# Patient Record
Sex: MALE | Race: WHITE | NOT HISPANIC OR LATINO | Employment: UNEMPLOYED | URBAN - METROPOLITAN AREA
[De-identification: names, ages, dates, MRNs, and addresses within clinical notes are randomized per-mention and may not be internally consistent; named-entity substitution may affect disease eponyms.]

---

## 2024-07-12 ENCOUNTER — OFFICE VISIT (OUTPATIENT)
Dept: FAMILY MEDICINE | Facility: OTHER | Age: 29
End: 2024-07-12
Payer: COMMERCIAL

## 2024-07-12 VITALS
HEIGHT: 69 IN | OXYGEN SATURATION: 98 % | BODY MASS INDEX: 32.58 KG/M2 | DIASTOLIC BLOOD PRESSURE: 74 MMHG | HEART RATE: 77 BPM | SYSTOLIC BLOOD PRESSURE: 122 MMHG | WEIGHT: 220 LBS | TEMPERATURE: 99 F | RESPIRATION RATE: 18 BRPM

## 2024-07-12 DIAGNOSIS — R40.0 DAYTIME SLEEPINESS: ICD-10-CM

## 2024-07-12 DIAGNOSIS — E55.9 VITAMIN D DEFICIENCY: ICD-10-CM

## 2024-07-12 DIAGNOSIS — F17.200 NICOTINE DEPENDENCE WITH CURRENT USE: ICD-10-CM

## 2024-07-12 DIAGNOSIS — R53.83 MALAISE AND FATIGUE: ICD-10-CM

## 2024-07-12 DIAGNOSIS — K21.9 GASTROESOPHAGEAL REFLUX DISEASE WITHOUT ESOPHAGITIS: ICD-10-CM

## 2024-07-12 DIAGNOSIS — N40.0 BENIGN PROSTATIC HYPERPLASIA WITHOUT LOWER URINARY TRACT SYMPTOMS: ICD-10-CM

## 2024-07-12 DIAGNOSIS — Z00.00 ROUTINE HISTORY AND PHYSICAL EXAMINATION OF ADULT: Primary | ICD-10-CM

## 2024-07-12 DIAGNOSIS — E66.811 OBESITY (BMI 30.0-34.9): ICD-10-CM

## 2024-07-12 DIAGNOSIS — G47.00 PERSISTENT INSOMNIA: ICD-10-CM

## 2024-07-12 DIAGNOSIS — F20.9 SCHIZOPHRENIA, UNSPECIFIED TYPE (H): ICD-10-CM

## 2024-07-12 DIAGNOSIS — E78.5 HYPERLIPIDEMIA LDL GOAL <130: ICD-10-CM

## 2024-07-12 DIAGNOSIS — R53.81 MALAISE AND FATIGUE: ICD-10-CM

## 2024-07-12 LAB
ALBUMIN SERPL BCG-MCNC: 4.5 G/DL (ref 3.5–5.2)
ALP SERPL-CCNC: 81 U/L (ref 40–150)
ALT SERPL W P-5'-P-CCNC: 28 U/L (ref 0–70)
ANION GAP SERPL CALCULATED.3IONS-SCNC: 10 MMOL/L (ref 7–15)
AST SERPL W P-5'-P-CCNC: 21 U/L (ref 0–45)
BILIRUB SERPL-MCNC: 0.6 MG/DL
BUN SERPL-MCNC: 13.1 MG/DL (ref 6–20)
CALCIUM SERPL-MCNC: 9.2 MG/DL (ref 8.6–10)
CHLORIDE SERPL-SCNC: 101 MMOL/L (ref 98–107)
CHOLEST SERPL-MCNC: 192 MG/DL
CREAT SERPL-MCNC: 0.86 MG/DL (ref 0.67–1.17)
DEPRECATED HCO3 PLAS-SCNC: 26 MMOL/L (ref 22–29)
EGFRCR SERPLBLD CKD-EPI 2021: >90 ML/MIN/1.73M2
ERYTHROCYTE [DISTWIDTH] IN BLOOD BY AUTOMATED COUNT: 12.7 % (ref 10–15)
FASTING STATUS PATIENT QL REPORTED: YES
FASTING STATUS PATIENT QL REPORTED: YES
GLUCOSE SERPL-MCNC: 93 MG/DL (ref 70–99)
HCT VFR BLD AUTO: 45.5 % (ref 40–53)
HDLC SERPL-MCNC: 41 MG/DL
HGB BLD-MCNC: 15.4 G/DL (ref 13.3–17.7)
LDLC SERPL CALC-MCNC: 117 MG/DL
MCH RBC QN AUTO: 30.1 PG (ref 26.5–33)
MCHC RBC AUTO-ENTMCNC: 33.8 G/DL (ref 31.5–36.5)
MCV RBC AUTO: 89 FL (ref 78–100)
NONHDLC SERPL-MCNC: 151 MG/DL
PLATELET # BLD AUTO: 298 10E3/UL (ref 150–450)
POTASSIUM SERPL-SCNC: 4.4 MMOL/L (ref 3.4–5.3)
PROT SERPL-MCNC: 7.5 G/DL (ref 6.4–8.3)
PSA SERPL DL<=0.01 NG/ML-MCNC: 0.61 NG/ML
RBC # BLD AUTO: 5.11 10E6/UL (ref 4.4–5.9)
SODIUM SERPL-SCNC: 137 MMOL/L (ref 135–145)
TRIGL SERPL-MCNC: 172 MG/DL
WBC # BLD AUTO: 8.3 10E3/UL (ref 4–11)

## 2024-07-12 PROCEDURE — 99385 PREV VISIT NEW AGE 18-39: CPT | Performed by: PHYSICIAN ASSISTANT

## 2024-07-12 PROCEDURE — 86481 TB AG RESPONSE T-CELL SUSP: CPT | Performed by: PHYSICIAN ASSISTANT

## 2024-07-12 PROCEDURE — 80061 LIPID PANEL: CPT | Performed by: PHYSICIAN ASSISTANT

## 2024-07-12 PROCEDURE — G0103 PSA SCREENING: HCPCS | Performed by: PHYSICIAN ASSISTANT

## 2024-07-12 PROCEDURE — 80053 COMPREHEN METABOLIC PANEL: CPT | Performed by: PHYSICIAN ASSISTANT

## 2024-07-12 PROCEDURE — 99213 OFFICE O/P EST LOW 20 MIN: CPT | Mod: 25 | Performed by: PHYSICIAN ASSISTANT

## 2024-07-12 PROCEDURE — 85027 COMPLETE CBC AUTOMATED: CPT | Performed by: PHYSICIAN ASSISTANT

## 2024-07-12 PROCEDURE — 36415 COLL VENOUS BLD VENIPUNCTURE: CPT | Performed by: PHYSICIAN ASSISTANT

## 2024-07-12 RX ORDER — ACETAMINOPHEN 325 MG/1
650 TABLET, CHEWABLE ORAL EVERY 4 HOURS
COMMUNITY

## 2024-07-12 RX ORDER — LANOLIN ALCOHOL/MO/W.PET/CERES
1 CREAM (GRAM) TOPICAL AT BEDTIME
COMMUNITY
Start: 2024-05-22

## 2024-07-12 RX ORDER — HALOPERIDOL 10 MG/1
1 TABLET ORAL AT BEDTIME
COMMUNITY
Start: 2024-06-10

## 2024-07-12 RX ORDER — NICOTINE 21 MG/24HR
1 PATCH, TRANSDERMAL 24 HOURS TRANSDERMAL
COMMUNITY
Start: 2024-05-22

## 2024-07-12 RX ORDER — AMPICILLIN TRIHYDRATE 500 MG
1000 CAPSULE ORAL
COMMUNITY
Start: 2024-05-22

## 2024-07-12 RX ORDER — BUPROPION HYDROCHLORIDE 150 MG/1
150 TABLET ORAL
COMMUNITY
Start: 2024-04-24

## 2024-07-12 RX ORDER — LURASIDONE HYDROCHLORIDE 80 MG/1
80 TABLET, FILM COATED ORAL
COMMUNITY
Start: 2024-04-23

## 2024-07-12 RX ORDER — CALCIUM CARBONATE 500(1250)
1250 TABLET,CHEWABLE ORAL
COMMUNITY

## 2024-07-12 RX ORDER — CYCLOBENZAPRINE HCL 10 MG
10 TABLET ORAL
COMMUNITY
Start: 2024-06-27

## 2024-07-12 RX ORDER — TAMSULOSIN HYDROCHLORIDE 0.4 MG/1
1 CAPSULE ORAL AT BEDTIME
COMMUNITY
Start: 2024-04-24

## 2024-07-12 SDOH — HEALTH STABILITY: PHYSICAL HEALTH: ON AVERAGE, HOW MANY DAYS PER WEEK DO YOU ENGAGE IN MODERATE TO STRENUOUS EXERCISE (LIKE A BRISK WALK)?: 0 DAYS

## 2024-07-12 ASSESSMENT — PATIENT HEALTH QUESTIONNAIRE - PHQ9
SUM OF ALL RESPONSES TO PHQ QUESTIONS 1-9: 24
10. IF YOU CHECKED OFF ANY PROBLEMS, HOW DIFFICULT HAVE THESE PROBLEMS MADE IT FOR YOU TO DO YOUR WORK, TAKE CARE OF THINGS AT HOME, OR GET ALONG WITH OTHER PEOPLE: EXTREMELY DIFFICULT
SUM OF ALL RESPONSES TO PHQ QUESTIONS 1-9: 24

## 2024-07-12 ASSESSMENT — SOCIAL DETERMINANTS OF HEALTH (SDOH): HOW OFTEN DO YOU GET TOGETHER WITH FRIENDS OR RELATIVES?: NEVER

## 2024-07-12 ASSESSMENT — PAIN SCALES - GENERAL: PAINLEVEL: MODERATE PAIN (4)

## 2024-07-12 NOTE — LETTER
July 12, 2024      Daryn Chavarria  [address] [address]              Dear ,    We are writing to inform you of your test results.    Complete blood cell count, electrolytes, kidney and liver function as well as prostate-specific antigen are within normal limits at this point in time.     Cholesterol profile shows need for increased physical exercise to raise your HDL above 50 as an ultimate goal.  LDL cholesterol is somewhat higher than I like to see but when you are 28 years of age you have some time to take a look at lifestyle modifications like diet and exercise to address this more completely.  Do recommend that you pay careful attention to this and follow-up in 1 year.     Resulted Orders   CBC with platelets   Result Value Ref Range    WBC Count 8.3 4.0 - 11.0 10e3/uL    RBC Count 5.11 4.40 - 5.90 10e6/uL    Hemoglobin 15.4 13.3 - 17.7 g/dL    Hematocrit 45.5 40.0 - 53.0 %    MCV 89 78 - 100 fL    MCH 30.1 26.5 - 33.0 pg    MCHC 33.8 31.5 - 36.5 g/dL    RDW 12.7 10.0 - 15.0 %    Platelet Count 298 150 - 450 10e3/uL   Comprehensive metabolic panel (BMP + Alb, Alk Phos, ALT, AST, Total. Bili, TP)   Result Value Ref Range    Sodium 137 135 - 145 mmol/L    Potassium 4.4 3.4 - 5.3 mmol/L    Carbon Dioxide (CO2) 26 22 - 29 mmol/L    Anion Gap 10 7 - 15 mmol/L    Urea Nitrogen 13.1 6.0 - 20.0 mg/dL    Creatinine 0.86 0.67 - 1.17 mg/dL    GFR Estimate >90 >60 mL/min/1.73m2      Comment:      eGFR calculated using 2021 CKD-EPI equation.    Calcium 9.2 8.6 - 10.0 mg/dL    Chloride 101 98 - 107 mmol/L    Glucose 93 70 - 99 mg/dL    Alkaline Phosphatase 81 40 - 150 U/L    AST 21 0 - 45 U/L      Comment:      Reference intervals for this test were updated on 6/12/2023 to more accurately reflect our healthy population. There may be differences in the flagging of prior results with similar values performed with this method. Interpretation of those prior results can be made in the context of the  updated reference intervals.    ALT 28 0 - 70 U/L      Comment:      Reference intervals for this test were updated on 6/12/2023 to more accurately reflect our healthy population. There may be differences in the flagging of prior results with similar values performed with this method. Interpretation of those prior results can be made in the context of the updated reference intervals.      Protein Total 7.5 6.4 - 8.3 g/dL    Albumin 4.5 3.5 - 5.2 g/dL    Bilirubin Total 0.6 <=1.2 mg/dL    Patient Fasting > 8hrs? Yes    Lipid panel reflex to direct LDL Fasting   Result Value Ref Range    Cholesterol 192 <200 mg/dL    Triglycerides 172 (H) <150 mg/dL    Direct Measure HDL 41 >=40 mg/dL    LDL Cholesterol Calculated 117 (H) <=100 mg/dL    Non HDL Cholesterol 151 (H) <130 mg/dL    Patient Fasting > 8hrs? Yes     Narrative    Cholesterol  Desirable:  <200 mg/dL    Triglycerides  Normal:  Less than 150 mg/dL  Borderline High:  150-199 mg/dL  High:  200-499 mg/dL  Very High:  Greater than or equal to 500 mg/dL    Direct Measure HDL  Female:  Greater than or equal to 50 mg/dL   Male:  Greater than or equal to 40 mg/dL    LDL Cholesterol  Desirable:  <100mg/dL  Above Desirable:  100-129 mg/dL   Borderline High:  130-159 mg/dL   High:  160-189 mg/dL   Very High:  >= 190 mg/dL    Non HDL Cholesterol  Desirable:  130 mg/dL  Above Desirable:  130-159 mg/dL  Borderline High:  160-189 mg/dL  High:  190-219 mg/dL  Very High:  Greater than or equal to 220 mg/dL   PSA, screen   Result Value Ref Range    Prostate Specific Antigen Screen 0.61 ng/mL      Comment:      No reference ranges have been established for patients under 40 years.    Narrative    This result is obtained using the Roche Elecsys total PSA method on the kale e601 immunoassay analyzer. Results obtained with different assay methods or kits cannot be used interchangeably.       If you have any questions or concerns, please call the clinic at the number listed above.        Sincerely,      Chriss Aparicio PA-C

## 2024-07-12 NOTE — PROGRESS NOTES
Preventive Care Visit  [facility]  Chriss Aparicio PA-C, Family Medicine  Jul 12, 2024      Assessment & Plan     Routine history and physical examination of adult  Patient is a 28-year-old male here for routine physical and paperwork for group home.  He presents to the clinic with his group home nurse today.  Given all of the psychiatric component to his person I am pleasantly surprised at his ability to grasp which medications he is on how often he takes them and for what conditions they are trying to maintain and treat.  Advised that he continue to follow-up from there.  A POLST form is reviewed and filled out for the patient today.  He wishes to be DNR/DNI.  Comfort measures only.  - CBC with platelets; Future  - Comprehensive metabolic panel (BMP + Alb, Alk Phos, ALT, AST, Total. Bili, TP); Future  - Lipid panel reflex to direct LDL Fasting; Future  - PSA, screen; Future  - Quantiferon TB Gold Plus; Future  - CBC with platelets  - Comprehensive metabolic panel (BMP + Alb, Alk Phos, ALT, AST, Total. Bili, TP)  - Lipid panel reflex to direct LDL Fasting  - PSA, screen  - Quantiferon TB Gold Plus    Gastroesophageal reflux disease without esophagitis  Historically noted with no new concerns.  He is not taking any medication for this.  Do recommend that he monitor carefully and begin omeprazole again should that be necessary.      Obesity (BMI 30.0-34.9)  Persistent insomnia  Malaise and fatigue  Daytime sleepiness  He is on metformin from his psychiatric provider for weight loss.  Is is unclear as to if it is truly helping.  I do have concerns for the potential sleep apnea given his slightly large tongue and oral cavity as well as his daytime sleepiness.  He is unaware if he snores or not.  He is on a couple different medications that certainly could keep him tired during the daytime.  He does struggle with some insomnia and sleep initiation at nighttime.  - Adult Sleep Eval & Management  " Referral; Future    Schizophrenia, unspecified type (H)  Stable on current medications.  Follow-up as needed with psychiatry given the medications that he is currently on.  I will not be refilling these medications for him.    Hyperlipidemia LDL goal <130  LDL goal established.  Labs pending.  Follow-up based on results.    Nicotine dependence with current use  Currently on nicotine patch with some mild success.  Follow-up as needed.    Benign prostatic hyperplasia without lower urinary tract symptoms  Historically noted and seeing his urologist soon.    Vitamin D deficiency  Historically noted and will have him continue with vitamin D supplementation.    Patient has been advised of split billing requirements and indicates understanding: Yes        Nicotine/Tobacco Cessation  He reports that he has been smoking cigarettes. He has a 1.8 pack-year smoking history. He uses smokeless tobacco.  Nicotine/Tobacco Cessation Plan  Information offered: Patient not interested at this time    He already has NicoDerm patches in place.    BMI  Estimated body mass index is 32.47 kg/m  as calculated from the following:    Height as of this encounter: 1.753 m (5' 9.02\").    Weight as of this encounter: 99.8 kg (220 lb).   Weight management plan: Discussed healthy diet and exercise guidelines    Depression Screening Follow Up        7/12/2024     6:56 AM   PHQ   PHQ-9 Total Score 24   Q9: Thoughts of better off dead/self-harm past 2 weeks Not at all         7/12/2024     6:56 AM   Last PHQ-9   1.  Little interest or pleasure in doing things 3   2.  Feeling down, depressed, or hopeless 3   3.  Trouble falling or staying asleep, or sleeping too much 3   4.  Feeling tired or having little energy 3   5.  Poor appetite or overeating 3   6.  Feeling bad about yourself 3   7.  Trouble concentrating 3   8.  Moving slowly or restless 3   Q9: Thoughts of better off dead/self-harm past 2 weeks 0   PHQ-9 Total Score 24         Follow " Up Actions Taken  Patient declines any type of referral at this point in time.  Needs to make sure that he continues to follow-up with his psychiatrist.     Counseling  Appropriate preventive services were discussed with this patient, including applicable screening as appropriate for fall prevention, nutrition, physical activity, Tobacco-use cessation, weight loss and cognition.  Checklist reviewing preventive services available has been given to the patient.  Reviewed patient's diet, addressing concerns and/or questions.   Patient is at risk for social isolation and has been provided with information about the benefit of social connection.   The patient was instructed to see the dentist every 6 months.   The patient's PHQ-9 score is consistent with severe depression. He was provided with information regarding depression.         Work on weight loss  Regular exercise    Mai Stearns is a 28 year old, presenting for the following:  Physical        7/12/2024     7:07 AM   Additional Questions   Roomed by yasmany lan   Accompanied by Home care nurse        Health Care Directive  Patient does not have a Health Care Directive or Living Will: Discussed advance care planning with patient; however, patient declined at this time.    HPI          7/12/2024   General Health   How would you rate your overall physical health? (!) POOR   Feel stress (tense, anxious, or unable to sleep) Very much      (!) STRESS CONCERN      7/12/2024   Nutrition   Three or more servings of calcium each day? (!) NO   Diet: Regular (no restrictions)   How many servings of fruit and vegetables per day? (!) 0-1   How many sweetened beverages each day? (!) 4+            7/12/2024   Exercise   Days per week of moderate/strenous exercise 0 days      (!) EXERCISE CONCERN      7/12/2024   Social Factors   Frequency of gathering with friends or relatives Never   Worry food won't last until get money to buy more No   Food not last or not have enough money  for food? Yes   Do you have housing? (Housing is defined as stable permanent housing and does not include staying ouside in a car, in a tent, in an abandoned building, in an overnight shelter, or couch-surfing.) No   Are you worried about losing your housing? Yes   Lack of transportation? Yes   Unable to get utilities (heat,electricity)? Yes   Want help with housing or utility concern? No      (!) FOOD SECURITY CONCERN PRESENT (!) TRANSPORTATION CONCERN PRESENT(!) HOUSING CONCERN PRESENT(!) FINANCIAL RESOURCE STRAIN CONCERN(!) SOCIAL CONNECTIONS CONCERN      7/12/2024   Dental   Dentist two times every year? (!) NO            7/12/2024   TB Screening   Were you born outside of the [place]? No          Today's PHQ-9 Score:       7/12/2024     6:56 AM   PHQ-9 SCORE   PHQ-9 Total Score MyChart 24 (Severe depression)   PHQ-9 Total Score 24         7/12/2024   Substance Use   Alcohol more than 3/day or more than 7/wk No   Do you use any other substances recreationally? No        Social History     Tobacco Use    Smoking status: Light Smoker     Current packs/day: 0.25     Average packs/day: 0.3 packs/day for 7.0 years (1.8 ttl pk-yrs)     Types: Cigarettes    Smokeless tobacco: Current   Substance Use Topics    Alcohol use: Not Currently    Drug use: Not Currently     Types: Marijuana     Comment: THC, Acid, Ecstacy.  Last use about 3 months ago.           7/12/2024   STI Screening   New sexual partner(s) since last STI/HIV test? No            7/12/2024   Contraception/Family Planning   Questions about contraception or family planning No           Reviewed and updated as needed this visit by Provider                    Past Medical History:   Diagnosis Date    Agranulocytosis (H24)     Associated with Clozapine    Chemical dependency (H)     Hypertension     Intellectual disability     Involuntary commitment     Mixed personality disorder (H)     Obesity     Schizoid personality (H)     Schizophrenia (H)      No past surgical  history on file.  Lab work is in process  Labs reviewed in EPIC  BP Readings from Last 3 Encounters:   07/12/24 122/74   11/03/15 136/86   10/16/15 130/68    Wt Readings from Last 3 Encounters:   07/12/24 99.8 kg (220 lb)   11/01/15 90.7 kg (200 lb) (93%, Z= 1.49)*   10/29/15 92.9 kg (204 lb 12.8 oz) (95%, Z= 1.60)*     * Growth percentiles are based on [facility] (Boys, 2-20 Years) data.                  Patient Active Problem List   Diagnosis    Schizophrenia, acute (H)    Drug-induced neutropenia (H24)    MENTAL HEALTH    Hyperlipidemia LDL goal <130    Obesity (BMI 30.0-34.9)    Gastroesophageal reflux disease without esophagitis    Nicotine dependence with current use    Benign prostatic hyperplasia without lower urinary tract symptoms    Vitamin D deficiency    Persistent insomnia     No past surgical history on file.    Social History     Tobacco Use    Smoking status: Light Smoker     Current packs/day: 0.25     Average packs/day: 0.3 packs/day for 7.0 years (1.8 ttl pk-yrs)     Types: Cigarettes    Smokeless tobacco: Current   Substance Use Topics    Alcohol use: Not Currently     Family History   Problem Relation Age of Onset    Depression Other          Current Outpatient Medications   Medication Sig Dispense Refill    buPROPion (WELLBUTRIN XL) 150 MG 24 hr tablet Take 150 mg by mouth      Cholecalciferol (D 1000) 25 MCG (1000 UT) CAPS Take 1,000 Units by mouth      cyclobenzaprine (FLEXERIL) 10 MG tablet Take 10 mg by mouth      diclofenac (VOLTAREN) 1 % topical gel Apply 2 g topically      haloperidol (HALDOL) 10 MG tablet Take 1 tablet by mouth at bedtime      lurasidone (LATUDA) 80 MG TABS tablet Take 80 mg by mouth      melatonin 3 MG tablet Take 1 tablet by mouth at bedtime      metFORMIN (GLUCOPHAGE) 500 MG tablet Take 500 mg by mouth      nicotine (NICODERM CQ) 14 MG/24HR 24 hr patch Place 1 patch onto the skin      nicotine (NICODERM CQ) 21 MG/24HR 24 hr patch Place 1 patch onto the skin      nicotine  "(NICODERM CQ) 7 MG/24HR 24 hr patch Place 1 patch onto the skin      tamsulosin (FLOMAX) 0.4 MG capsule Take 1 capsule by mouth at bedtime      Acetaminophen 325 MG CHEW Take 650 mg by mouth every 4 hours      calcium carbonate 500 mg, elemental, 1250 (500 Ca) MG tablet chewable Take 1,250 mg by mouth      cloZAPine (CLOZARIL) 50 MG tablet Take PO at bedtime  50mg x 1 night  75mg x 1 night  100mg x 5 nights 13 tablet 0    escitalopram (LEXAPRO) 5 MG tablet Take 1 tablet (5 mg) by mouth daily 7 tablet 0    mirtazapine (REMERON) 15 MG tablet Take 0.5 tablets (7.5 mg) by mouth At Bedtime (Patient taking differently: Take 7.5 mg by mouth nightly as needed ) 30 tablet 1    QUEtiapine (SEROQUEL XR) 300 MG 24 hr tablet Take 2 tablets (600 mg) by mouth At Bedtime 60 tablet 0    QUEtiapine (SEROQUEL) 100 MG tablet For anxiety or agitation 30 tablet 3    QUEtiapine Fumarate (SEROQUEL PO) Take 25 mg by mouth 3 times daily as needed (1-2 tablets as needed for anxiety or agitation)       Allergies   Allergen Reactions    Clozapine      Neutropenia     Recent Labs   Lab Test 12/27/19  0835 12/24/19  0718 12/23/19  0530 12/22/19  1728   ALT 47*  --  68* 84*   CR  --   --   --  1.06   GFRESTIMATED  --   --   --  >60   GFRESTBLACK  --   --   --  >60   POTASSIUM  --   --   --  4.3   TSH  --  1.79  --   --           Review of Systems  Constitutional, HEENT, cardiovascular, pulmonary, GI, , musculoskeletal, neuro, skin, endocrine and psych systems are negative, except as otherwise noted.     Objective    Exam  /74   Pulse 77   Temp 99  F (37.2  C) (Temporal)   Resp 18   Ht 1.753 m (5' 9.02\")   Wt 99.8 kg (220 lb)   SpO2 98%   BMI 32.47 kg/m     Estimated body mass index is 32.47 kg/m  as calculated from the following:    Height as of this encounter: 1.753 m (5' 9.02\").    Weight as of this encounter: 99.8 kg (220 lb).    Physical Exam  GENERAL: alert and no distress  EYES: Eyes grossly normal to inspection, PERRL and " conjunctivae and sclerae normal  HENT: ear canals and TM's normal, nose and mouth without ulcers or lesions  NECK: no adenopathy, no asymmetry, masses, or scars  RESP: lungs clear to auscultation - no rales, rhonchi or wheezes  CV: regular rate and rhythm, normal S1 S2, no S3 or S4, no murmur, click or rub, no peripheral edema  ABDOMEN: soft, nontender, no hepatosplenomegaly, no masses and bowel sounds normal  MS: no gross musculoskeletal defects noted, no edema  SKIN: no suspicious lesions or rashes to exposed visible skin today.  NEURO: Normal strength and tone, mentation intact and speech normal  PSYCH: mentation appears normal, affect normal/bright        Signed Electronically by: Chriss Aparicio PA-C

## 2024-07-14 LAB
GAMMA INTERFERON BACKGROUND BLD IA-ACNC: 0.02 IU/ML
M TB IFN-G BLD-IMP: NEGATIVE
M TB IFN-G CD4+ BCKGRND COR BLD-ACNC: 9.98 IU/ML
MITOGEN IGNF BCKGRD COR BLD-ACNC: 0.02 IU/ML
MITOGEN IGNF BCKGRD COR BLD-ACNC: 0.04 IU/ML
QUANTIFERON MITOGEN: 10 IU/ML
QUANTIFERON NIL TUBE: 0.02 IU/ML
QUANTIFERON TB1 TUBE: 0.06 IU/ML
QUANTIFERON TB2 TUBE: 0.04

## 2024-07-16 ENCOUNTER — DOCUMENTATION ONLY (OUTPATIENT)
Dept: OTHER | Facility: CLINIC | Age: 29
End: 2024-07-16